# Patient Record
Sex: MALE | Race: WHITE
[De-identification: names, ages, dates, MRNs, and addresses within clinical notes are randomized per-mention and may not be internally consistent; named-entity substitution may affect disease eponyms.]

---

## 2019-04-15 ENCOUNTER — HOSPITAL ENCOUNTER (EMERGENCY)
Dept: HOSPITAL 80 - FED | Age: 74
Discharge: HOME | End: 2019-04-15
Payer: COMMERCIAL

## 2019-04-15 VITALS — SYSTOLIC BLOOD PRESSURE: 150 MMHG | DIASTOLIC BLOOD PRESSURE: 100 MMHG

## 2019-04-15 DIAGNOSIS — N40.0: ICD-10-CM

## 2019-04-15 DIAGNOSIS — E86.9: ICD-10-CM

## 2019-04-15 DIAGNOSIS — R10.30: ICD-10-CM

## 2019-04-15 DIAGNOSIS — K57.32: Primary | ICD-10-CM

## 2019-04-15 DIAGNOSIS — E03.9: ICD-10-CM

## 2019-04-15 LAB — PLATELET # BLD: 270 10^3/UL (ref 150–400)

## 2019-04-15 PROCEDURE — 96361 HYDRATE IV INFUSION ADD-ON: CPT

## 2019-04-15 PROCEDURE — 99285 EMERGENCY DEPT VISIT HI MDM: CPT

## 2019-04-15 PROCEDURE — 96374 THER/PROPH/DIAG INJ IV PUSH: CPT

## 2019-04-15 PROCEDURE — 96375 TX/PRO/DX INJ NEW DRUG ADDON: CPT

## 2019-04-15 PROCEDURE — 74177 CT ABD & PELVIS W/CONTRAST: CPT

## 2019-04-15 NOTE — EDPHY
H & P


Time Seen by Provider: 04/15/19 19:20


HPI/ROS: 





CHIEF COMPLAINT:  Abdominal pain





HISTORY OF PRESENT ILLNESS:  Patient is a 73-year-old male who presents 

emergency department suprapubic abdominal pain.  He feels as though his pain is 

"deep inside."He describes his pain is moderate.  It is not radiate.  His pain 

is slightly worse with movement.  He feels as though he has to have a bowel 

movement but when he sits on the toilet he does not defecate.  He denies 

dysuria frequency.  No hematuria.  No flank pain.  No nausea vomiting.  No 

diarrhea.  No recent trauma.  No testicular pain.  No pain with defecation.  No 

rectal bleeding.





REVIEW OF SYSTEMS:  


10 systems were reveiwed and are negative with the exception of the elements 

mentioned in the history of present illness.


Past Medical/Surgical History: 





Includes enlarged prostate, hypothyroidism, kyphosis





Past surgical history:  Knee surgery, hernia repair





Social history:  Patient does not smoke


Smoking Status: Never smoked


Physical Exam: 





Vitals noted


GENERAL:  Well-appearing, in no acute distress, alert.


HEENT:  Eyes normal to inspection, normal pharynx, no signs of dehydration.


NECK:  Normal, supple.


RESPIRATORY:  Clear to auscultation bilaterally, no rales, rhonchi or wheezing.


CVS:  Regular rate and rhythm, no rubs, murmurs, or gallops.


ABDOMEN:  Soft, nontender, nondistended, no organomegaly.  Benign on exam


BACK:  Normal to inspection, no CVA tenderness.


SKIN:  Normal color, no rash, warm, dry.  No pallor.


EXTREMITIES:  No pedal edema, no calf tenderness, no joint swelling.


NEURO/PSYCH:  Alert and oriented, normal mood and affect, normal motor sensory 

exam.  


Constitutional: 


 Initial Vital Signs











Temperature (C)  36.7 C   04/15/19 19:15


 


Heart Rate  70   04/15/19 19:15


 


Respiratory Rate  16   04/15/19 19:15


 


Blood Pressure  157/103 H  04/15/19 19:15


 


O2 Sat (%)  94   04/15/19 19:15








 











O2 Delivery Mode               Room Air














Allergies/Adverse Reactions: 


 





cephalexin monohydrate [From Keflex] Allergy (Mild, Verified 11/12/10 11:56)


 Rash








Home Medications: 














 Medication  Instructions  Recorded


 


Ciprofloxacin [Cipro] 500 mg PO BID #20 tab 04/15/19


 


Finasteride  04/15/19


 


Meloxicam  04/15/19


 


Tamsulosin HCl  04/15/19


 


Thyroid  04/15/19


 


metroNIDAZOLE [Flagyl 500 mg (*)] 500 mg PO BID #20 tab 04/15/19














Medical Decision Making





- Diagnostics


Imaging Results: 


 Imaging Impressions





Abdomen CT  04/15/19 19:54


Impression: 


1.  Sigmoid diverticulitis without evidence of abscess or free air.


2.  Moderate hiatal hernia.


3.  Ectasia of the iliac arteries.


 


Jamilah Hernandez was notified of these findings by telephone at 9:16 PM on 4/15/

2019











ED Course/Re-evaluation: 





In the emergency department I discussed possible etiologies with the patient.  

I answered all of his questions.  IV was placed.  Laboratory studies were 

obtained.  Patient given fentanyl 50 mcg IV for pain and Zofran 4 mg IV for 

nausea.





Patient's white count was normal.  The rest CBC was unremarkable.  Chemistry 

panel showed a slightly low sodium.  UA was negative.





Abdomen pelvis CT:  Please refer the dictated report.  The patient has sigmoid 

diverticulitis.  No abscess.  No perforation.





I discussed the results with the patient.  Answered all his questions.  On 

recheck the patient was sitting comfortably in bed.  He had no new complaints.  

Abdomen was benign.  The patient was given a dose of Flagyl and Cipro.  He was 

given prescription upon discharge.  He will follow up with his primary care 

physician.  He is given warnings prior to leaving.


Differential Diagnosis: 





Differential includes but is not limited to urinary tract infection, 

pyelonephritis, prostatitis, orchitis, small-bowel obstruction, perforation, 

diverticulitis, abscess





- Data Points


Laboratory Results: 


 Laboratory Results





 04/15/19 19:35 





 04/15/19 19:35 





 











  04/15/19 04/15/19 04/15/19





  19:35 19:35 19:35


 


WBC      9.45 10^3/uL 10^3/uL





     (3.80-9.50) 


 


RBC      4.55 10^6/uL 10^6/uL





     (4.40-6.38) 


 


Hgb      14.9 g/dL g/dL





     (13.7-17.5) 


 


Hct      43.1 % %





     (40.0-51.0) 


 


MCV      94.7 fL fL





     (81.5-99.8) 


 


MCH      32.7 pg pg





     (27.9-34.1) 


 


MCHC      34.6 g/dL g/dL





     (32.4-36.7) 


 


RDW      12.7 % %





     (11.5-15.2) 


 


Plt Count      270 10^3/uL 10^3/uL





     (150-400) 


 


MPV      10.5 fL fL





     (8.7-11.7) 


 


Neut % (Auto)      79.4 % H %





     (39.3-74.2) 


 


Lymph % (Auto)      12.0 % L %





     (15.0-45.0) 


 


Mono % (Auto)      6.9 % %





     (4.5-13.0) 


 


Eos % (Auto)      1.2 % %





     (0.6-7.6) 


 


Baso % (Auto)      0.4 % %





     (0.3-1.7) 


 


Nucleat RBC Rel Count      0.0 % %





     (0.0-0.2) 


 


Absolute Neuts (auto)      7.51 10^3/uL H 10^3/uL





     (1.70-6.50) 


 


Absolute Lymphs (auto)      1.13 10^3/uL 10^3/uL





     (1.00-3.00) 


 


Absolute Monos (auto)      0.65 10^3/uL 10^3/uL





     (0.30-0.80) 


 


Absolute Eos (auto)      0.11 10^3/uL 10^3/uL





     (0.03-0.40) 


 


Absolute Basos (auto)      0.04 10^3/uL 10^3/uL





     (0.02-0.10) 


 


Absolute Nucleated RBC      0.00 10^3/uL 10^3/uL





     (0-0.01) 


 


Immature Gran %      0.1 % %





     (0.0-1.1) 


 


Immature Gran #      0.01 10^3/uL 10^3/uL





     (0.00-0.10) 


 


Sodium    134 mEq/L L mEq/L  





    (135-145)  


 


Potassium    4.2 mEq/L mEq/L  





    (3.5-5.2)  


 


Chloride    104 mEq/L mEq/L  





    ()  


 


Carbon Dioxide    21 mEq/l L mEq/l  





    (22-31)  


 


Anion Gap    9 mEq/L mEq/L  





    (6-14)  


 


BUN    22 mg/dL mg/dL  





    (7-23)  


 


Creatinine    1.0 mg/dL mg/dL  





    (0.7-1.3)  


 


Estimated GFR    > 60   





    


 


Glucose    85 mg/dL mg/dL  





    ()  


 


Calcium    9.3 mg/dL mg/dL  





    (8.5-10.4)  


 


Urine Color  PALE YELLOW     





    


 


Urine Appearance  CLEAR     





    


 


Urine pH  6.0     





   (5.0-7.5)   


 


Ur Specific Gravity  1.004     





   (1.002-1.030)   


 


Urine Protein  NEGATIVE     





   (NEGATIVE)   


 


Urine Ketones  NEGATIVE     





   (NEGATIVE)   


 


Urine Blood  NEGATIVE     





   (NEGATIVE)   


 


Urine Nitrate  NEGATIVE     





   (NEGATIVE)   


 


Urine Bilirubin  NEGATIVE     





   (NEGATIVE)   


 


Urine Urobilinogen  NEGATIVE EU EU    





   (0.2-1.0)   


 


Ur Leukocyte Esterase  NEGATIVE     





   (NEGATIVE)   


 


Urine RBC  1-3 /hpf /hpf    





   (0-3)   


 


Urine WBC  1-3 /hpf /hpf    





   (0-3)   


 


Ur Epithelial Cells  NONE SEEN /lpf /lpf    





   (NONE-1+)   


 


Urine Glucose  NEGATIVE     





   (NEGATIVE)   











Medications Given: 


 








Discontinued Medications





Fentanyl (Sublimaze)  50 mcg IVP EDNOW ONE


   Stop: 04/15/19 19:33


   Last Admin: 04/15/19 19:42 Dose:  50 mcg


Sodium Chloride (Ns)  1,000 mls @ 0 mls/hr IV EDNOW ONE; Wide Open


   PRN Reason: Protocol


   Stop: 04/15/19 19:33


   Last Admin: 04/15/19 19:43 Dose:  1,000 mls


Ondansetron HCl (Zofran)  4 mg IVP EDNOW ONE


   Stop: 04/15/19 19:33


   Last Admin: 04/15/19 19:42 Dose:  4 mg








Departure





- Departure


Disposition: Home, Routine, Self-Care


Clinical Impression: 


 Diverticulitis





Abdominal pain


Qualifiers:


 Abdominal location: lower abdomen, unspecified Qualified Code(s): R10.30 - 

Lower abdominal pain, unspecified





Condition: Good


Instructions:  Diverticulitis (ED), Abdominal Pain (ED)


Additional Instructions: 


Take your entire course of antibiotics.  Return with increasing pain, fever or 

any other concerns.


Referrals: 


Ariadne Boo MD [Primary Care Provider] - 2-3 days without fail


Prescriptions: 


Ciprofloxacin [Cipro] 500 mg PO BID #20 tab


metroNIDAZOLE [Flagyl 500 mg (*)] 500 mg PO BID #20 tab